# Patient Record
Sex: FEMALE | Race: WHITE | NOT HISPANIC OR LATINO | Employment: OTHER | URBAN - METROPOLITAN AREA
[De-identification: names, ages, dates, MRNs, and addresses within clinical notes are randomized per-mention and may not be internally consistent; named-entity substitution may affect disease eponyms.]

---

## 2021-06-22 PROBLEM — R41.82 ALTERED MENTAL STATUS: Status: ACTIVE | Noted: 2021-06-22

## 2021-06-23 PROBLEM — I10 HTN (HYPERTENSION): Status: ACTIVE | Noted: 2021-06-23

## 2021-06-23 PROBLEM — F32.A DEPRESSION: Status: ACTIVE | Noted: 2021-06-23

## 2021-06-23 PROBLEM — G93.41 ENCEPHALOPATHY, METABOLIC: Status: ACTIVE | Noted: 2021-06-22

## 2021-06-23 PROBLEM — R55 SYNCOPE: Status: ACTIVE | Noted: 2021-06-23

## 2021-06-25 PROBLEM — G93.41 ENCEPHALOPATHY, METABOLIC: Status: RESOLVED | Noted: 2021-06-22 | Resolved: 2021-06-25

## 2021-06-25 PROBLEM — R55 SYNCOPE: Status: RESOLVED | Noted: 2021-06-23 | Resolved: 2021-06-25

## 2024-04-08 ENCOUNTER — HOSPITAL ENCOUNTER (OUTPATIENT)
Dept: TELEMEDICINE | Facility: HOSPITAL | Age: 84
Discharge: HOME | End: 2024-04-08

## 2024-04-08 PROCEDURE — G0408 INPT/TELE FOLLOW UP 35: HCPCS | Mod: GT,,, | Performed by: STUDENT IN AN ORGANIZED HEALTH CARE EDUCATION/TRAINING PROGRAM

## 2024-04-08 NOTE — TELEMEDICINE CONSULT
[facility]  Vascular Neurology  Comprehensive Stroke Center  TeleVascular Neurology Interprofessional Consult Note           Consult Information  Consults    Consulting Provider: LATOYA ROSE   Patient Location: [facility] - TELEMEDICINE ED [facility] PATIENT FLOW CENTER     Summary of patient's symptoms:  84 y/o female with a history of dementia, HTN who presented with generalized weakness, encephalopathy. LKN unknown.  Per the family, she fell 3 times yesterday and was noted to have dysarthria and right facial weakness.    /83    On exam, per the ER provider, she is confused, possibly slightly worse than her baseline.  Appears generally weak with no asymmetry.  ?  Mild dysarthria.  No facial asymmetry seen.  No gaze deviation.         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  ?  Hyperdensity in the distal vertebral arteries and VB junction -> ? Artifactual  No intracranial hemorrhage.        Assessment and plan:    Acute encephalopathy with underlying dementia along with generalized weakness and possibly dysarthria.  Also noted to have right facial weakness by the family yesterday which is apparently not evident on exam today.  Ddx acute ischemic stroke (? Posterior circulation) vs hypertensive emergency vs toxic/metabolic/infectious etiologies.    Not a candidate for any acute intervention due to unknown LKN.    Recommend CTA of the head and neck ( if renal function permits) to assess the vertebrobasilar system.   MRI of the brain . If renal function does not permit use of contrast, recommend adding on MRA head and neck to MRI brain.  Permissive HTN w/ BP <220/110 until MRI brain rules out acute ischemia.  If MRI shows acute acute ischemia, please consult Vascular Neurology for further  recs.  Toxic/metabolic workup per ED     I spent approximately 20 minutes on this encounter. More than half of that time was spent communicating with the consulting provider and coordinating patient care.     Signature  Calvin Esparza MD        This encounter was conducted as an interprofessional communication between providers at the spoke and vascular neurologist. The interaction was completed over the phone or via secure messaging (electronic medical record - [email or web address] Secure Chat).     Once this note was completed, a written copy was sent back to the provider via fax or electronic medical record.